# Patient Record
Sex: MALE | URBAN - METROPOLITAN AREA
[De-identification: names, ages, dates, MRNs, and addresses within clinical notes are randomized per-mention and may not be internally consistent; named-entity substitution may affect disease eponyms.]

---

## 2024-09-09 ENCOUNTER — APPOINTMENT (OUTPATIENT)
Dept: ORTHOPEDIC SURGERY | Facility: CLINIC | Age: 44
End: 2024-09-09
Payer: COMMERCIAL

## 2024-09-11 ENCOUNTER — APPOINTMENT (OUTPATIENT)
Dept: ORTHOPEDIC SURGERY | Facility: CLINIC | Age: 44
End: 2024-09-11
Payer: COMMERCIAL

## 2024-09-17 ENCOUNTER — APPOINTMENT (OUTPATIENT)
Dept: ORTHOPEDIC SURGERY | Facility: CLINIC | Age: 44
End: 2024-09-17
Payer: COMMERCIAL

## 2024-09-23 ENCOUNTER — OFFICE VISIT (OUTPATIENT)
Dept: ORTHOPEDIC SURGERY | Facility: CLINIC | Age: 44
End: 2024-09-23
Payer: COMMERCIAL

## 2024-09-23 ENCOUNTER — TELEPHONE (OUTPATIENT)
Dept: ORTHOPEDIC SURGERY | Facility: CLINIC | Age: 44
End: 2024-09-23

## 2024-09-23 VITALS — BODY MASS INDEX: 27.09 KG/M2 | WEIGHT: 200 LBS | HEIGHT: 72 IN

## 2024-09-23 DIAGNOSIS — M75.42 ROTATOR CUFF IMPINGEMENT SYNDROME OF LEFT SHOULDER: Primary | ICD-10-CM

## 2024-09-23 PROCEDURE — 3008F BODY MASS INDEX DOCD: CPT | Performed by: FAMILY MEDICINE

## 2024-09-23 PROCEDURE — 99204 OFFICE O/P NEW MOD 45 MIN: CPT | Performed by: FAMILY MEDICINE

## 2024-09-23 NOTE — TELEPHONE ENCOUNTER
Chris called and wanted to change his appointment time for the PRP injection. He moved his appointment to 2 pm tomorrow.    Thank you.

## 2024-09-23 NOTE — PROGRESS NOTES
History of Present Illness   Chief Complaint   Patient presents with    Left Shoulder - Pain     No recent injury  +pain 2/2024  +lrom +sleep  H/O orif clavicle 2010         The patient is 44 y.o. male  here with a complaint of left shoulder pain.  Onset of symptoms a little over 6 months ago, atraumatic in nature.  He admits to pain over the anterior and lateral aspect of his shoulder, exacerbated by overhead activity, lifting things, laying on his shoulder at night.  Patient works in construction, does lay concrete.  He was initially following with his primary care provider, he had x-rays that were unremarkable aside from previous ORIF of the clavicle, he denies any pain to the clavicle, he had a subsequent MRI that showed some tendinopathy of the supraspinatus tendon without any other derangement of the shoulder.  He did see orthopedic provider through [facility] in [place], he was treated with a subacromial injection with Kenalog that provided some short-term relief but had quick recurrence of symptoms.  He was scheduled to have PRP injection of the shoulder done last month but there was a scheduling mixup and he was not able to proceed with this, wanted to seek alternative opinion with regards to PRP today.  He denies any new injuries or symptoms over the past several months.  He takes Tylenol as needed for pain.  He does admit to history of prior narcotic addiction.    No past medical history on file.    Medication Documentation Review Audit    **Prior to Admission medications have not yet been reviewed**         Allergies   Allergen Reactions    Penicillins Unknown       Social History     Socioeconomic History    Marital status: Unknown     Spouse name: Not on file    Number of children: Not on file    Years of education: Not on file    Highest education level: Not on file   Occupational History    Not on file   Tobacco Use    Smoking status: Every Day     Current packs/day: 0.50     Types:  normal appearance , without tenderness upon palpation , no deformities , trachea midline , Thyroid normal size , no thyroid nodules , no masses , no JVD , thyroid nontender Cigarettes    Smokeless tobacco: Never   Substance and Sexual Activity    Alcohol use: Never    Drug use: Never    Sexual activity: Not on file   Other Topics Concern    Not on file   Social History Narrative    Not on file     Social Determinants of Health     Financial Resource Strain: Not on file   Food Insecurity: Not on file   Transportation Needs: Not on file   Physical Activity: Not on file   Stress: Not on file   Social Connections: Not on file   Intimate Partner Violence: Not on file   Housing Stability: Not on file       No past surgical history on file.       Review of Systems   GENERAL: Negative  GI: Negative  MUSCULOSKELETAL: See HPI  SKIN: Negative  NEURO:  Negative     Physical Exam:    General/Constitutional: well appearing, no distress, appears stated age  HEENT: sclera clear  Respiratory: non labored breathing  Vascular: No edema, swelling or tenderness, except as noted in detailed exam.  Integumentary: No impressive skin lesions present, except as noted in detailed exam.  Neurological:  Alert and oriented   Psychological:  Normal mood and affect.  Musculoskeletal: Normal, except as noted in detailed exam and in HPI    Left shoulder: Well-healed surgical scar from clavicle ORIF, otherwise normal appearance.  There is some tenderness at the subacromial space, mild tenderness at the proximal biceps tendon.  He has full range of motion equal to the left with forward flexion and abduction, positive painful arc with both.  Internal rotation to lumbar spine.  There is no significant weakness with rotator cuff strength testing but there is some pain with resisted abduction.  He does have pain with Morejon and Neer's test.  Negative Speed, negative Yergason.  Positive [place].       Imaging MRI of left shoulder is available for review, there is some tendinopathy of the supraspinatus at its insertion on the greater tuberosity, no other abnormalities are seen, images are somewhat limited by susceptibility  artifact from clavicle ORIF      Assessment   1. Rotator cuff impingement syndrome of left shoulder              Plan: Left shoulder pain consistent with rotator cuff tendinopathy, short-term response to previous corticosteroid injection, he says that he has no time for formal physical therapy.  He was interested in PRP injection we discussed the risks and benefits of this, he does appear to be a good candidate based on age, pathology at the shoulder.  He will follow-up this week for leukocyte rich PRP injection, he will be given a home exercise rehabilitation program to begin following injection to assist in recovery, we will not prescribe tramadol after procedure given history of narcotic addiction, he is not currently taking NSAIDs.

## 2024-09-24 ENCOUNTER — APPOINTMENT (OUTPATIENT)
Dept: ORTHOPEDIC SURGERY | Facility: CLINIC | Age: 44
End: 2024-09-24
Payer: COMMERCIAL

## 2024-09-24 ENCOUNTER — HOSPITAL ENCOUNTER (OUTPATIENT)
Dept: RADIOLOGY | Facility: EXTERNAL LOCATION | Age: 44
Discharge: HOME | End: 2024-09-24

## 2024-09-24 ENCOUNTER — OFFICE VISIT (OUTPATIENT)
Dept: ORTHOPEDIC SURGERY | Facility: CLINIC | Age: 44
End: 2024-09-24

## 2024-09-24 DIAGNOSIS — M25.512 ACUTE PAIN OF LEFT SHOULDER: ICD-10-CM

## 2024-09-24 DIAGNOSIS — M75.42 ROTATOR CUFF IMPINGEMENT SYNDROME OF LEFT SHOULDER: Primary | ICD-10-CM

## 2024-09-24 NOTE — PROGRESS NOTES
History of Present Illness   Chief Complaint   Patient presents with    Left Shoulder - Follow-up     PRP injection       The patient is 44 y.o. male  here left shoulder PRP injection for treatment of the tendinopathy of supraspinatus tendon.  See recent note for full details of history.  Patient is not currently on any NSAIDs, no contraindication to PRP injection.  All questions and concerns were answered at consultation visit, no new questions with regards to his symptoms today.    No past medical history on file.    Medication Documentation Review Audit       Reviewed by Dominguez Guajardo MD (Physician) on 09/23/24 at 1644      Medication Order Taking? Sig Documenting Provider Last Dose Status            No Medications to Display                                   Allergies   Allergen Reactions    Penicillins Unknown       Social History     Socioeconomic History    Marital status: Unknown     Spouse name: Not on file    Number of children: Not on file    Years of education: Not on file    Highest education level: Not on file   Occupational History    Not on file   Tobacco Use    Smoking status: Every Day     Current packs/day: 0.50     Types: Cigarettes    Smokeless tobacco: Never   Substance and Sexual Activity    Alcohol use: Never    Drug use: Never    Sexual activity: Not on file   Other Topics Concern    Not on file   Social History Narrative    Not on file     Social Determinants of Health     Financial Resource Strain: Not on file   Food Insecurity: Not on file   Transportation Needs: Not on file   Physical Activity: Not on file   Stress: Not on file   Social Connections: Not on file   Intimate Partner Violence: Not on file   Housing Stability: Not on file       No past surgical history on file.       Review of Systems   GENERAL: Negative  GI: Negative  MUSCULOSKELETAL: See HPI  SKIN: Negative  NEURO:  Negative     Physical Exam:    General/Constitutional: well appearing, no distress, appears stated  age  HEENT: sclera clear  Respiratory: non labored breathing  Vascular: No edema, swelling or tenderness, except as noted in detailed exam.  Integumentary: No impressive skin lesions present, except as noted in detailed exam.  Neurological:  Alert and oriented   Psychological:  Normal mood and affect.  Musculoskeletal: Normal, except as noted in detailed exam and in HPI    Left shoulder, normal appearance, no overlying skin changes at injection site at the shoulder.       PROCEDURE    Ultrasound-guided left shoulder rotator cuff/subacromial injection with leukocyte rich PRP    Consent:  After the risks and benefits of the procedure were explained, consent was given by the patient to proceed.     Procedure:    52 cc of peripheral blood with extracted from the patient at the left forearm. This was mixed with 8cc of anticoagulant. The blood the then centrifuged using the [employer] Evan system to obtain 3cc of leukocyte rich  PRP,     The left supraspinatus tendon and subacromial space was identified under ultrasound guidance, the area to be injected was then cleansed with alcohol solution    Using a lateral approach the supraspinatus tendon and subacromial space injected with PRP solution obtained from patient as above.  I did perform some needle fenestration on the supraspinatus tendon during the procedure.  During injection, there was unrestricted flow and care was taken not to inject into the skin or subcutaneous tissues.     A sterile band-aide was applied.  Post-injection instructions were given regarding post-procedure care, when to follow up in clinic and what to expect from the procedure.  The patient tolerated the injection well and was discharged without complication.      Assessment   1. Rotator cuff impingement syndrome of left shoulder        2. Acute pain of left shoulder  Point of Care Ultrasound            Plan: Successful ultrasound-guided left shoulder supraspinatus/subacromial injection with leukocyte  rich PRP.  Patient tolerated procedure without issue, see procedure note for full details.  Patient will use Tylenol for any worsening postprocedural pain that he may experience over the next few days, he does have history of prior narcotic addiction, we will avoid any narcotic pain medications at this time.  He will avoid NSAIDs for at least the next 2 weeks.  He was given a home exercise rehabilitation program that he will begin to assist in his recovery.  He will follow-up in 6 weeks for reassessment.

## 2024-11-05 ENCOUNTER — HOSPITAL ENCOUNTER (OUTPATIENT)
Dept: RADIOLOGY | Facility: EXTERNAL LOCATION | Age: 44
Discharge: HOME | End: 2024-11-05

## 2024-11-05 ENCOUNTER — APPOINTMENT (OUTPATIENT)
Dept: ORTHOPEDIC SURGERY | Facility: CLINIC | Age: 44
End: 2024-11-05
Payer: COMMERCIAL

## 2024-11-05 DIAGNOSIS — M75.42 ROTATOR CUFF IMPINGEMENT SYNDROME OF LEFT SHOULDER: Primary | ICD-10-CM

## 2024-11-05 DIAGNOSIS — M25.512 ACUTE PAIN OF LEFT SHOULDER: ICD-10-CM

## 2024-11-05 PROCEDURE — 20550 NJX 1 TENDON SHEATH/LIGAMENT: CPT | Performed by: FAMILY MEDICINE

## 2024-11-05 PROCEDURE — 76942 ECHO GUIDE FOR BIOPSY: CPT | Performed by: FAMILY MEDICINE

## 2024-11-05 PROCEDURE — 99213 OFFICE O/P EST LOW 20 MIN: CPT | Performed by: FAMILY MEDICINE

## 2024-11-05 RX ORDER — TRIAMCINOLONE ACETONIDE 40 MG/ML
40 INJECTION, SUSPENSION INTRA-ARTICULAR; INTRAMUSCULAR
Status: COMPLETED | OUTPATIENT
Start: 2024-11-05 | End: 2024-11-05

## 2024-11-05 RX ORDER — LIDOCAINE HYDROCHLORIDE 20 MG/ML
1 INJECTION, SOLUTION INFILTRATION; PERINEURAL
Status: COMPLETED | OUTPATIENT
Start: 2024-11-05 | End: 2024-11-05

## 2024-11-05 NOTE — PROGRESS NOTES
History of Present Illness   Chief Complaint   Patient presents with    Left Shoulder - Follow-up       The patient is 44 y.o. male  here for follow-up of left shoulder pain.  Patient was last seen by me 6 weeks ago.  See previous notes for full details.  In brief patient has been dealing with some left shoulder pain for a little over 6 months, history of prior ORIF of clavicle without any residual pain, he did have MRI ordered by outside orthopedic provider that showed some tendinopathy of the distal supraspinatus tendon, there was some artifact from ORIF limiting study to some extent.  We did proceed with PRP injection at his last visit as he had had prior short-term response to subacromial Kenalog injection from outside orthopedic provider and they had discussed PRP as next steps but there was scheduling issue with that provider prompting him to see me.  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  Patient would like to get back to working out, says that he attempted to do some push-ups recently and had some worsening pain, he points to the anterior aspect of his shoulder as area of most consistent discomfort.  He takes Tylenol as needed for pain.  He does admit to history of prior narcotic addiction.    No past medical history on file.    Medication Documentation Review Audit       Reviewed by Dominguez Guajardo MD (Physician) on 09/24/24 at 1445      Medication Order Taking? Sig Documenting Provider Last Dose Status            No Medications to Display                                   Allergies   Allergen Reactions    Penicillins Unknown       Social History     Socioeconomic History    Marital status: Unknown     Spouse name: Not on file    Number of children: Not on file    Years of education: Not on file    Highest education level: Not  on file   Occupational History    Not on file   Tobacco Use    Smoking status: Every Day     Current packs/day: 0.50     Types: Cigarettes    Smokeless tobacco: Never   Substance and Sexual Activity    Alcohol use: Never    Drug use: Never    Sexual activity: Not on file   Other Topics Concern    Not on file   Social History Narrative    Not on file     Social Drivers of Health     Financial Resource Strain: Not on file   Food Insecurity: Not on file   Transportation Needs: Not on file   Physical Activity: Not on file   Stress: Not on file   Social Connections: Not on file   Intimate Partner Violence: Not on file   Housing Stability: Not on file       No past surgical history on file.       Review of Systems   GENERAL: Negative  GI: Negative  MUSCULOSKELETAL: See HPI  SKIN: Negative  NEURO:  Negative     Physical Exam:    General/Constitutional: well appearing, no distress, appears stated age  HEENT: sclera clear  Respiratory: non labored breathing  Vascular: No edema, swelling or tenderness, except as noted in detailed exam.  Integumentary: No impressive skin lesions present, except as noted in detailed exam.  Neurological:  Alert and oriented   Psychological:  Normal mood and affect.  Musculoskeletal: Normal, except as noted in detailed exam and in HPI    Left shoulder: Well-healed surgical scar from clavicle ORIF, otherwise normal appearance.  No significant tenderness at the subacromial space, there is tenderness at the proximal biceps tendon.  He has full range of motion equal to the left with forward flexion and abduction, Internal rotation to lumbar spine.  There is no significant weakness with rotator cuff strength testing but there is some pain with resisted abduction.  He does have pain with Morejon and Neer's test.  Negative Speed, negative Yergason.  Positive [place].       No new imaging today, previous MRI showed some tendinopathy of the supraspinatus at its insertion on the greater tuberosity, no  other abnormalities are seen, images are somewhat limited by susceptibility artifact from clavicle ORIF.  No definitive abnormality of the proximal biceps tendon    Tendon Sheath Injection: left long head of biceps tendon sheath on 11/5/2024 3:31 PM  Indications: pain  Details: 22 G needle, ultrasound-guided  Medications: 40 mg triamcinolone acetonide 40 mg/mL; 1 mL lidocaine 20 mg/mL (2 %)  Outcome: tolerated well, no immediate complications  Procedure, treatment alternatives, risks and benefits explained, specific risks discussed. Patient was prepped and draped in the usual sterile fashion.               Assessment   1. Rotator cuff impingement syndrome of left shoulder        2. Acute pain of left shoulder  Point of Care Ultrasound            Plan: 6-week status post left shoulder PRP injection for treatment of supraspinatus tendinopathy, there has been some mild improvement in symptoms overall but still having some pain, more focal pain at the proximal biceps tendon today.  Discussed further workup and treatment.  Did discuss that PRP can continue to show improvement over the next few months.  We also discussed consideration of diagnostic proximal biceps tendon sheath injection today.  He was interested in this, we did proceed with ultrasound-guided left shoulder biceps tendon sheath injection with Kenalog.  He will monitor for any notable improvement in symptoms following this injection over the next week or so, he will also monitor for more gradual improvement in symptoms over the next 6 weeks.  He will continue with his home exercise rehabilitation.  He will follow-up in 6 weeks for reassessment.

## 2024-12-17 ENCOUNTER — APPOINTMENT (OUTPATIENT)
Dept: ORTHOPEDIC SURGERY | Facility: CLINIC | Age: 44
End: 2024-12-17
Payer: COMMERCIAL

## 2024-12-30 ENCOUNTER — APPOINTMENT (OUTPATIENT)
Dept: ORTHOPEDIC SURGERY | Facility: CLINIC | Age: 44
End: 2024-12-30
Payer: COMMERCIAL

## 2024-12-30 DIAGNOSIS — M75.42 ROTATOR CUFF IMPINGEMENT SYNDROME OF LEFT SHOULDER: Primary | ICD-10-CM

## 2024-12-30 PROCEDURE — 99213 OFFICE O/P EST LOW 20 MIN: CPT | Performed by: FAMILY MEDICINE

## 2024-12-30 NOTE — PROGRESS NOTES
History of Present Illness   Chief Complaint   Patient presents with    Left Shoulder - Follow-up       The patient is 44 y.o. male  here for follow-up of left shoulder pain.  Patient was last seen by me 6 weeks ago.  See previous notes for full details.  In brief patient has been dealing with some left shoulder pain since around February 2024, atraumatic in nature.  History of prior ORIF of clavicle greater than 10 years ago without any residual pain, he did have MRI ordered by outside orthopedic provider that showed some tendinopathy of the distal supraspinatus tendon, there was some artifact from ORIF limiting study to some extent.  Prior to seeing me was following with outside orthopedic provider through [facility], he had previous subacromial injection which provided short-term relief.  He was interested in PRP injection which we did proceed with in September, at his 6-week follow-up visit he was still having pain but more focal to the anterior aspect of the shoulder near the proximal biceps tendon, at that time we opted to proceed with ultrasound-guided biceps tendon sheath injection.  He says that there was minimal change in symptoms following this.  He admits to ongoing pain in his shoulder, he does have some pain with overhead activity, some pain laying on his shoulder at night.  He would like to get back into working out but is limited because of his shoulder pain. He takes Tylenol as needed for pain.  He has been doing home exercise rehabilitation program.  He does admit to history of prior narcotic addiction.    No past medical history on file.    Medication Documentation Review Audit       Reviewed by Dominguez Guajardo MD (Physician) on 11/05/24 at 1533      Medication Order Taking? Sig Documenting Provider Last Dose Status            No Medications to Display                                   Allergies   Allergen Reactions    Penicillins Unknown       Social History     Socioeconomic History    Marital  status: Unknown     Spouse name: Not on file    Number of children: Not on file    Years of education: Not on file    Highest education level: Not on file   Occupational History    Not on file   Tobacco Use    Smoking status: Every Day     Current packs/day: 0.50     Types: Cigarettes    Smokeless tobacco: Never   Substance and Sexual Activity    Alcohol use: Never    Drug use: Never    Sexual activity: Not on file   Other Topics Concern    Not on file   Social History Narrative    Not on file     Social Drivers of Health     Financial Resource Strain: Not on file   Food Insecurity: Not on file   Transportation Needs: Not on file   Physical Activity: Not on file   Stress: Not on file   Social Connections: Not on file   Intimate Partner Violence: Not on file   Housing Stability: Not on file       No past surgical history on file.       Review of Systems   GENERAL: Negative  GI: Negative  MUSCULOSKELETAL: See HPI  SKIN: Negative  NEURO:  Negative     Physical Exam:    General/Constitutional: well appearing, no distress, appears stated age  HEENT: sclera clear  Respiratory: non labored breathing  Vascular: No edema, swelling or tenderness, except as noted in detailed exam.  Integumentary: No impressive skin lesions present, except as noted in detailed exam.  Neurological:  Alert and oriented   Psychological:  Normal mood and affect.  Musculoskeletal: Normal, except as noted in detailed exam and in HPI    Left shoulder: Well-healed surgical scar from clavicle ORIF, otherwise normal appearance.  There is some tenderness at the proximal biceps tendon and subacromial space today..  He has full range of motion equal to the left with forward flexion and abduction, Internal rotation to lumbar spine.  There is no significant weakness with rotator cuff strength testing but there is some pain with resisted abduction.  He does have pain with Morejon and Neer's test.  Negative Speed, negative Yergason.  Positive [place].       No  new imaging today, previous MRI showed some tendinopathy of the supraspinatus at its insertion on the greater tuberosity, no other abnormalities are seen, images are somewhat limited by susceptibility artifact from clavicle ORIF.  No definitive abnormality of the proximal biceps tendon            Assessment   1. Rotator cuff impingement syndrome of left shoulder                Plan: Patient has been dealing some ongoing pain despite conservative management.  He is around 3 months out from supraspinatus PRP injection with minimal improvement, 6 weeks ago we did proceed with proximal biceps tendon sheath injection with minimal change.  He has been compliant with home exercise rehabilitation program.  We discussed further workup and treatment.  We discussed potential consideration of surgical evaluation for possible shoulder arthroscopy, limited in other nonoperative treatment at this time.  Patient was interested in surgical consultation, referral was placed.  All questions and concerns were answered.

## 2025-01-16 ENCOUNTER — APPOINTMENT (OUTPATIENT)
Dept: ORTHOPEDIC SURGERY | Facility: CLINIC | Age: 45
End: 2025-01-16
Payer: COMMERCIAL

## 2025-01-30 ENCOUNTER — APPOINTMENT (OUTPATIENT)
Dept: ORTHOPEDIC SURGERY | Facility: CLINIC | Age: 45
End: 2025-01-30
Payer: COMMERCIAL

## 2025-01-30 VITALS — HEIGHT: 72 IN | BODY MASS INDEX: 27.09 KG/M2 | WEIGHT: 200 LBS

## 2025-01-30 DIAGNOSIS — M75.42 ROTATOR CUFF IMPINGEMENT SYNDROME OF LEFT SHOULDER: Primary | ICD-10-CM

## 2025-01-30 DIAGNOSIS — M25.512 ACUTE PAIN OF LEFT SHOULDER: ICD-10-CM

## 2025-01-30 RX ORDER — CELECOXIB 200 MG/1
200 CAPSULE ORAL DAILY
Qty: 30 CAPSULE | Refills: 0 | Status: SHIPPED | OUTPATIENT
Start: 2025-01-30 | End: 2025-03-01

## 2025-01-30 RX ADMIN — TRIAMCINOLONE ACETONIDE 40 MG: 40 INJECTION, SUSPENSION INTRA-ARTICULAR; INTRAMUSCULAR at 21:10

## 2025-01-30 RX ADMIN — LIDOCAINE HYDROCHLORIDE 2 ML: 10 INJECTION, SOLUTION INFILTRATION; PERINEURAL at 21:10

## 2025-01-31 ENCOUNTER — HOSPITAL ENCOUNTER (OUTPATIENT)
Dept: RADIOLOGY | Facility: EXTERNAL LOCATION | Age: 45
Discharge: HOME | End: 2025-01-31

## 2025-01-31 RX ORDER — LIDOCAINE HYDROCHLORIDE 10 MG/ML
2 INJECTION, SOLUTION INFILTRATION; PERINEURAL
Status: COMPLETED | OUTPATIENT
Start: 2025-01-30 | End: 2025-01-30

## 2025-01-31 RX ORDER — TRIAMCINOLONE ACETONIDE 40 MG/ML
40 INJECTION, SUSPENSION INTRA-ARTICULAR; INTRAMUSCULAR
Status: COMPLETED | OUTPATIENT
Start: 2025-01-30 | End: 2025-01-30

## 2025-02-01 NOTE — PROGRESS NOTES
44-year-old is seen with left shoulder pain.  He has been having persistent moderate throbbing pain in the left shoulder.  Pain is worse with overhead reaching and lifting activities.  He is referred by Dr. Guajardo.  He had ORIF of a clavicle fracture over 10 years ago.  He has seen a orthopedic provider at [facility].  He had a subacromial injection with short-term relief.  He had PRP injection with minimal improvement.  He has also had ultrasound-guided cortisone injection along the proximal bicep tendon with minimal relief.  He has used Tylenol and is performed an exercise program.  He has a history of prior narcotic addiction.    Pleasant and no acute distress.  Left shoulder forward flexion 160°. No effusion or instability. There is positive Neer and Morejon impingement. There is subacromial crepitus and tenderness around the subacromial space.  There is biceps tenderness. There is a positive Cable's test. No acromioclavicular tenderness. Rotator cuff strength is intact but there is discomfort with strength testing. Right shoulder forward flexion 180°. No effusion or instability. No impingement or crepitus or tenderness involving the subacromial space. No biceps or acromioclavicular tenderness. There is intact rotator cuff strength. There is adequate range of motion of the cervical spine without pain. Both upper extremities are well perfused skin is intact and muscle tone is adequate. Elbow flexion and extension and wrist flexion and extension strength are intact.    Multiple x-ray views of the left shoulder are personally reviewed and there is no acute bony abnormality.  The clavicle fracture is well-healed and there is a plate and screws with adequate alignment.    MRI of the left shoulder was personally reviewed and there is artifact from the clavicular plate.  The rotator cuff appears to be intact.  No obvious labral tear.    A discussion about his shoulder was done.  Treatment options were reviewed.  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  Treatment options were reviewed and the decision was made to proceed with a subacromial injection.  He was also prescribed Celebrex.  He has had GI irritation with NSAIDs before.  Discussion was done about shoulder arthroscopy.  If he does not improve with conservative treatment then shoulder arthroscopy with subacromial decompression and treatment of the rotator cuff is needed could be done and this was also discussed.    L Inj/Asp: L subacromial bursa on 1/30/2025 9:10 PM  Indications: pain  Details: 22 G needle, posterior approach  Medications: 40 mg triamcinolone acetonide 40 mg/mL; 2 mL lidocaine 10 mg/mL (1 %)  Procedure, treatment alternatives, risks and benefits explained, specific risks discussed. Consent was given by the patient.